# Patient Record
Sex: FEMALE | Race: WHITE | NOT HISPANIC OR LATINO | Employment: FULL TIME | URBAN - METROPOLITAN AREA
[De-identification: names, ages, dates, MRNs, and addresses within clinical notes are randomized per-mention and may not be internally consistent; named-entity substitution may affect disease eponyms.]

---

## 2021-02-25 ENCOUNTER — ACNE/ROSACEA (OUTPATIENT)
Dept: URBAN - METROPOLITAN AREA CLINIC 36 | Facility: CLINIC | Age: 21
Setting detail: DERMATOLOGY
End: 2021-02-25

## 2021-02-25 PROCEDURE — 99214 OFFICE O/P EST MOD 30 MIN: CPT

## 2021-02-25 RX ORDER — SPIRONOLACTONE 50 MG/1
1 TABLET TABLET, FILM COATED ORAL BID
Qty: 60 | Refills: 1
Start: 2021-02-25

## 2021-02-25 RX ORDER — TRIFAROTENE 50 UG/G
1 APPLICATION CREAM TOPICAL NIGHTLY
Qty: 45 | Refills: 1
Start: 2021-02-25

## 2021-04-06 ENCOUNTER — RX ONLY (RX ONLY)
Age: 21
End: 2021-04-06

## 2021-04-06 ENCOUNTER — FOLLOW UP (OUTPATIENT)
Dept: URBAN - METROPOLITAN AREA CLINIC 36 | Facility: CLINIC | Age: 21
Setting detail: DERMATOLOGY
End: 2021-04-06

## 2021-04-06 DIAGNOSIS — L82.0 INFLAMED SEBORRHEIC KERATOSIS: ICD-10-CM

## 2021-04-06 PROBLEM — L85.3 XEROSIS CUTIS: Status: RESOLVED | Noted: 2021-04-06

## 2021-04-06 PROBLEM — L85.3 XEROSIS CUTIS: Status: ACTIVE | Noted: 2021-04-06

## 2021-04-06 PROCEDURE — 99214 OFFICE O/P EST MOD 30 MIN: CPT

## 2021-04-06 RX ORDER — SPIRONOLACTONE 50 MG/1
1 TABLET TABLET, FILM COATED ORAL TWICE A DAY
Qty: 180 | Refills: 3
Start: 2021-04-06

## 2021-04-06 RX ORDER — TRIFAROTENE 50 UG/G
1 APPLICATION CREAM TOPICAL NIGHTLY
Qty: 45 | Refills: 3
Start: 2021-04-06

## 2021-04-06 RX ORDER — SULFACETAMIDE SODIUM, SULFUR 98; 48 MG/G; MG/G
LIBERALLY LIQUID TOPICAL ONCE A DAY
Qty: 285 | Refills: 6
Start: 2021-04-06

## 2021-04-14 ENCOUNTER — RX ONLY (RX ONLY)
Age: 21
End: 2021-04-14

## 2021-04-14 RX ORDER — SULFACETAMIDE SODIUM, SULFUR 98; 48 MG/G; MG/G
LIBERALLY LIQUID TOPICAL ONCE A DAY
Qty: 285 | Refills: 6
Start: 2021-04-14

## 2021-08-05 ENCOUNTER — OFFICE VISIT (OUTPATIENT)
Dept: URBAN - METROPOLITAN AREA CLINIC 92 | Facility: CLINIC | Age: 21
End: 2021-08-05
Payer: COMMERCIAL

## 2021-08-05 ENCOUNTER — WEB ENCOUNTER (OUTPATIENT)
Dept: URBAN - METROPOLITAN AREA CLINIC 92 | Facility: CLINIC | Age: 21
End: 2021-08-05

## 2021-08-05 DIAGNOSIS — R10.9 ABDOMINAL CRAMPING: ICD-10-CM

## 2021-08-05 DIAGNOSIS — R19.5 LOOSE STOOLS: ICD-10-CM

## 2021-08-05 PROCEDURE — 99204 OFFICE O/P NEW MOD 45 MIN: CPT | Performed by: INTERNAL MEDICINE

## 2021-08-05 RX ORDER — SODIUM, POTASSIUM,MAG SULFATES 17.5-3.13G
177ML SOLUTION, RECONSTITUTED, ORAL ORAL ONCE
Qty: 1 | Refills: 0 | OUTPATIENT
Start: 2021-08-05 | End: 2021-08-06

## 2021-08-05 NOTE — HPI-TODAY'S VISIT:
Complaint of abdominal discomfort which is generalized associated with alternating stools however diarrhea more predominant for several years.  Her symptoms of been for the past 2 weeks.  Averages 5-6 loose stools per day including nocturnal stools.  Moderate abdominal cramping and bloating.  Denies any GI bleeding, weight loss, upper GI symptoms.  No previous EGD or colonoscopy.  Is on a probiotic daily.  Previous celiac testing was negative per patient  Denies any family history of Crohn's or ulcerative colitis.  Denies any joint pains, rashes or oral ulcers.

## 2021-08-12 ENCOUNTER — TELEPHONE ENCOUNTER (OUTPATIENT)
Dept: URBAN - METROPOLITAN AREA CLINIC 92 | Facility: CLINIC | Age: 21
End: 2021-08-12

## 2021-08-16 ENCOUNTER — TELEPHONE ENCOUNTER (OUTPATIENT)
Dept: URBAN - METROPOLITAN AREA CLINIC 92 | Facility: CLINIC | Age: 21
End: 2021-08-16

## 2021-09-09 ENCOUNTER — CLAIMS CREATED FROM THE CLAIM WINDOW (OUTPATIENT)
Dept: URBAN - METROPOLITAN AREA CLINIC 4 | Facility: CLINIC | Age: 21
End: 2021-09-09
Payer: COMMERCIAL

## 2021-09-09 ENCOUNTER — OFFICE VISIT (OUTPATIENT)
Dept: URBAN - METROPOLITAN AREA SURGERY CENTER 16 | Facility: SURGERY CENTER | Age: 21
End: 2021-09-09
Payer: COMMERCIAL

## 2021-09-09 DIAGNOSIS — K21.9 GASTRO-ESOPHAGEAL REFLUX DISEASE WITHOUT ESOPHAGITIS: ICD-10-CM

## 2021-09-09 DIAGNOSIS — K63.89 POLYP, HYPERPLASTIC: ICD-10-CM

## 2021-09-09 DIAGNOSIS — K31.89 SUBEPITHELIAL MASS OF STOMACH: ICD-10-CM

## 2021-09-09 DIAGNOSIS — R19.7 ACUTE DIARRHEA: ICD-10-CM

## 2021-09-09 DIAGNOSIS — K21.9 ACID REFLUX: ICD-10-CM

## 2021-09-09 DIAGNOSIS — K63.89 BACTERIAL OVERGROWTH SYNDROME: ICD-10-CM

## 2021-09-09 PROCEDURE — 45380 COLONOSCOPY AND BIOPSY: CPT | Performed by: INTERNAL MEDICINE

## 2021-09-09 PROCEDURE — 88305 TISSUE EXAM BY PATHOLOGIST: CPT | Performed by: PATHOLOGY

## 2021-09-09 PROCEDURE — 43239 EGD BIOPSY SINGLE/MULTIPLE: CPT | Performed by: INTERNAL MEDICINE

## 2021-09-09 PROCEDURE — 88342 IMHCHEM/IMCYTCHM 1ST ANTB: CPT | Performed by: PATHOLOGY

## 2021-09-09 PROCEDURE — G8907 PT DOC NO EVENTS ON DISCHARG: HCPCS | Performed by: INTERNAL MEDICINE

## 2021-09-09 PROCEDURE — 88341 IMHCHEM/IMCYTCHM EA ADD ANTB: CPT | Performed by: PATHOLOGY

## 2021-09-20 ENCOUNTER — TELEPHONE ENCOUNTER (OUTPATIENT)
Dept: URBAN - METROPOLITAN AREA CLINIC 92 | Facility: CLINIC | Age: 21
End: 2021-09-20

## 2021-09-28 ENCOUNTER — OFFICE VISIT (OUTPATIENT)
Dept: URBAN - METROPOLITAN AREA TELEHEALTH 2 | Facility: TELEHEALTH | Age: 21
End: 2021-09-28
Payer: COMMERCIAL

## 2021-09-28 VITALS — BODY MASS INDEX: 28.32 KG/M2 | HEIGHT: 65 IN | WEIGHT: 170 LBS

## 2021-09-28 DIAGNOSIS — R19.5 LOOSE STOOLS: ICD-10-CM

## 2021-09-28 DIAGNOSIS — K58.0 IRRITABLE BOWEL SYNDROME WITH DIARRHEA: ICD-10-CM

## 2021-09-28 DIAGNOSIS — R10.9 ABDOMINAL CRAMPING: ICD-10-CM

## 2021-09-28 DIAGNOSIS — K21.00 GASTROESOPHAGEAL REFLUX DISEASE WITH ESOPHAGITIS WITHOUT HEMORRHAGE: ICD-10-CM

## 2021-09-28 PROCEDURE — 99214 OFFICE O/P EST MOD 30 MIN: CPT | Performed by: PHYSICIAN ASSISTANT

## 2021-09-28 RX ORDER — TRAZODONE HYDROCHLORIDE 50 MG/1
1 TABLET AT BEDTIME AS NEEDED TABLET, FILM COATED ORAL ONCE A DAY
Status: ACTIVE | COMMUNITY

## 2021-09-28 RX ORDER — LACTOBACIL 2/BIFIDO 1/S.THERMO 900B CELL
AS DIRECTED PACKET (EA) ORAL ONCE A DAY
Qty: 30 PACKET | Refills: 2 | OUTPATIENT
Start: 2021-09-28 | End: 2021-12-26

## 2021-09-28 RX ORDER — BUPROPION HYDROCHLORIDE 75 MG/1
2 TABLETS TABLET ORAL TWICE A DAY
Status: ACTIVE | COMMUNITY

## 2021-09-28 RX ORDER — SPIRONOLACTONE 50 MG/1
1 TABLET TABLET, FILM COATED ORAL ONCE A DAY
Status: ACTIVE | COMMUNITY

## 2021-09-28 RX ORDER — DESOGESTREL AND ETHINYL ESTRADIOL 0.15-0.03
1 TABLET KIT ORAL ONCE A DAY
Status: ACTIVE | COMMUNITY

## 2021-09-28 NOTE — HPI-TODAY'S VISIT:
21yoF seen last month for abdominal discomfort, generalized, associated with alternating stools however    diarrhea more predominant for several years.  Averages 5-6  loose stools per day including nocturnal stools.  Moderate abdominal cramping and bloating. Sx worse after eating.  Denies any GI bleeding, weight loss, upper GI symptoms. Is on a probiotic daily and has tried many without change.  Previous celiac testing was negative per patient EGD 9/9/2021 gastritis, bx + reflux esophagitis without HP or IM Colon same day erythema in TI and internal hemm, bx neg ileitis and colitis, Denies any family history of Crohn's or ulcerative colitis. No FH celiac or IBS. Very little dairy intake and tried cutting out without change.

## 2021-10-06 ENCOUNTER — RX ONLY (RX ONLY)
Age: 21
End: 2021-10-06

## 2021-10-06 ENCOUNTER — RASH (OUTPATIENT)
Dept: URBAN - METROPOLITAN AREA CLINIC 36 | Facility: CLINIC | Age: 21
Setting detail: DERMATOLOGY
End: 2021-10-06

## 2021-10-06 DIAGNOSIS — L57.0 ACTINIC KERATOSIS: ICD-10-CM

## 2021-10-06 PROCEDURE — 99214 OFFICE O/P EST MOD 30 MIN: CPT

## 2021-10-06 RX ORDER — MOMETASONE FUROATE 1 MG/G
1 A SMALL AMOUNT OINTMENT TOPICAL TWICE A DAY
Qty: 15 | Refills: 1
Start: 2021-10-06

## 2021-10-28 ENCOUNTER — DASHBOARD ENCOUNTERS (OUTPATIENT)
Age: 21
End: 2021-10-28

## 2021-11-02 ENCOUNTER — OFFICE VISIT (OUTPATIENT)
Dept: URBAN - METROPOLITAN AREA TELEHEALTH 2 | Facility: TELEHEALTH | Age: 21
End: 2021-11-02
Payer: COMMERCIAL

## 2021-11-02 VITALS — BODY MASS INDEX: 28.32 KG/M2 | HEIGHT: 65 IN | WEIGHT: 170 LBS

## 2021-11-02 DIAGNOSIS — K21.00 GASTROESOPHAGEAL REFLUX DISEASE WITH ESOPHAGITIS WITHOUT HEMORRHAGE: ICD-10-CM

## 2021-11-02 DIAGNOSIS — K58.0 IRRITABLE BOWEL SYNDROME WITH DIARRHEA: ICD-10-CM

## 2021-11-02 DIAGNOSIS — R10.84 ABDOMINAL CRAMPING, GENERALIZED: ICD-10-CM

## 2021-11-02 DIAGNOSIS — R19.5 LOOSE STOOLS: ICD-10-CM

## 2021-11-02 PROBLEM — 266433003: Status: ACTIVE | Noted: 2021-09-27

## 2021-11-02 PROBLEM — 197125005: Status: ACTIVE | Noted: 2021-09-28

## 2021-11-02 PROCEDURE — 99214 OFFICE O/P EST MOD 30 MIN: CPT | Performed by: INTERNAL MEDICINE

## 2021-11-02 RX ORDER — DESOGESTREL AND ETHINYL ESTRADIOL 0.15-0.03
1 TABLET KIT ORAL ONCE A DAY
Status: ACTIVE | COMMUNITY

## 2021-11-02 RX ORDER — TRAZODONE HYDROCHLORIDE 50 MG/1
1 TABLET AT BEDTIME AS NEEDED TABLET, FILM COATED ORAL ONCE A DAY
Status: ACTIVE | COMMUNITY

## 2021-11-02 RX ORDER — FAMOTIDINE 40 MG/1
1 TABLET AT BEDTIME TABLET, FILM COATED ORAL ONCE A DAY
Qty: 90 | Refills: 3 | OUTPATIENT
Start: 2021-11-02

## 2021-11-02 RX ORDER — LACTOBACIL 2/BIFIDO 1/S.THERMO 900B CELL
AS DIRECTED PACKET (EA) ORAL ONCE A DAY
Qty: 30 PACKET | Refills: 2 | Status: ACTIVE | COMMUNITY
Start: 2021-09-28 | End: 2021-12-26

## 2021-11-02 RX ORDER — BUPROPION HYDROCHLORIDE 75 MG/1
2 TABLETS TABLET ORAL TWICE A DAY
Status: ACTIVE | COMMUNITY

## 2021-11-02 RX ORDER — SPIRONOLACTONE 50 MG/1
1 TABLET TABLET, FILM COATED ORAL ONCE A DAY
Status: ACTIVE | COMMUNITY

## 2021-11-02 RX ORDER — LACTOBACIL 2/BIFIDO 1/S.THERMO 900B CELL
AS DIRECTED PACKET (EA) ORAL ONCE A DAY
Qty: 30 PACKET | Refills: 2 | OUTPATIENT

## 2021-11-02 RX ORDER — OMEPRAZOLE 40 MG/1
1 CAPSULE 30 MINUTES BEFORE MORNING MEAL CAPSULE, DELAYED RELEASE ORAL ONCE A DAY
Qty: 90 | Refills: 3 | OUTPATIENT
Start: 2021-11-02

## 2022-01-11 ENCOUNTER — OFFICE VISIT (OUTPATIENT)
Dept: URBAN - METROPOLITAN AREA TELEHEALTH 2 | Facility: TELEHEALTH | Age: 22
End: 2022-01-11

## 2022-02-17 ENCOUNTER — RX ONLY (RX ONLY)
Age: 22
End: 2022-02-17

## 2022-02-17 RX ORDER — TRIFAROTENE 50 UG/G
1 A SMALL AMOUNT CREAM TOPICAL EVERY NIGHT
Qty: 45 | Refills: 1 | Status: DISCONTINUED
Start: 2022-02-17 | End: 2022-02-17

## 2022-04-12 ENCOUNTER — ACNE (OUTPATIENT)
Dept: URBAN - METROPOLITAN AREA CLINIC 36 | Facility: CLINIC | Age: 22
Setting detail: DERMATOLOGY
End: 2022-04-12

## 2022-04-12 ENCOUNTER — RX ONLY (RX ONLY)
Age: 22
End: 2022-04-12

## 2022-04-12 DIAGNOSIS — Z79.899 OTHER LONG-TERM (CURRENT) DRUG THERAPY: ICD-10-CM

## 2022-04-12 PROCEDURE — 99214 OFFICE O/P EST MOD 30 MIN: CPT

## 2022-04-12 RX ORDER — SPIRONOLACTONE 50 MG/1
1 TABLET TABLET, FILM COATED ORAL TWICE A DAY
Qty: 180 | Refills: 3
Start: 2022-04-12